# Patient Record
Sex: MALE | URBAN - METROPOLITAN AREA
[De-identification: names, ages, dates, MRNs, and addresses within clinical notes are randomized per-mention and may not be internally consistent; named-entity substitution may affect disease eponyms.]

---

## 2022-07-26 ENCOUNTER — INPATIENT (INPATIENT)
Facility: HOSPITAL | Age: 42
LOS: 0 days | Discharge: LEFT BEFORE DISCHARGE | End: 2022-07-27
Attending: STUDENT IN AN ORGANIZED HEALTH CARE EDUCATION/TRAINING PROGRAM | Admitting: STUDENT IN AN ORGANIZED HEALTH CARE EDUCATION/TRAINING PROGRAM

## 2022-07-26 VITALS
SYSTOLIC BLOOD PRESSURE: 138 MMHG | HEIGHT: 70 IN | TEMPERATURE: 99 F | WEIGHT: 166.89 LBS | OXYGEN SATURATION: 99 % | RESPIRATION RATE: 18 BRPM | DIASTOLIC BLOOD PRESSURE: 103 MMHG | HEART RATE: 108 BPM

## 2022-07-26 DIAGNOSIS — M62.82 RHABDOMYOLYSIS: ICD-10-CM

## 2022-07-26 DIAGNOSIS — Z98.890 OTHER SPECIFIED POSTPROCEDURAL STATES: Chronic | ICD-10-CM

## 2022-07-26 DIAGNOSIS — N17.9 ACUTE KIDNEY FAILURE, UNSPECIFIED: ICD-10-CM

## 2022-07-26 LAB
ALBUMIN SERPL ELPH-MCNC: 5.6 G/DL — HIGH (ref 3.3–5)
ALP SERPL-CCNC: 114 U/L — SIGNIFICANT CHANGE UP (ref 40–120)
ALT FLD-CCNC: 54 U/L — SIGNIFICANT CHANGE UP (ref 12–78)
AMPHET UR-MCNC: NEGATIVE — SIGNIFICANT CHANGE UP
ANION GAP SERPL CALC-SCNC: 17 MMOL/L — SIGNIFICANT CHANGE UP (ref 5–17)
APPEARANCE UR: ABNORMAL
AST SERPL-CCNC: 46 U/L — HIGH (ref 15–37)
BACTERIA # UR AUTO: ABNORMAL
BARBITURATES UR SCN-MCNC: NEGATIVE — SIGNIFICANT CHANGE UP
BASOPHILS # BLD AUTO: 0.04 K/UL — SIGNIFICANT CHANGE UP (ref 0–0.2)
BASOPHILS NFR BLD AUTO: 0.2 % — SIGNIFICANT CHANGE UP (ref 0–2)
BENZODIAZ UR-MCNC: NEGATIVE — SIGNIFICANT CHANGE UP
BILIRUB SERPL-MCNC: 0.5 MG/DL — SIGNIFICANT CHANGE UP (ref 0.2–1.2)
BILIRUB UR-MCNC: ABNORMAL
BUN SERPL-MCNC: 63 MG/DL — HIGH (ref 7–23)
CALCIUM SERPL-MCNC: 11.2 MG/DL — HIGH (ref 8.5–10.1)
CHLORIDE SERPL-SCNC: 86 MMOL/L — LOW (ref 96–108)
CK SERPL-CCNC: 2391 U/L — HIGH (ref 26–308)
CO2 SERPL-SCNC: 25 MMOL/L — SIGNIFICANT CHANGE UP (ref 22–31)
COCAINE METAB.OTHER UR-MCNC: NEGATIVE — SIGNIFICANT CHANGE UP
COLOR SPEC: YELLOW — SIGNIFICANT CHANGE UP
CREAT SERPL-MCNC: 5.54 MG/DL — HIGH (ref 0.5–1.3)
DIFF PNL FLD: ABNORMAL
EGFR: 13 ML/MIN/1.73M2 — LOW
EOSINOPHIL # BLD AUTO: 0.01 K/UL — SIGNIFICANT CHANGE UP (ref 0–0.5)
EOSINOPHIL NFR BLD AUTO: 0.1 % — SIGNIFICANT CHANGE UP (ref 0–6)
EPI CELLS # UR: SIGNIFICANT CHANGE UP
FLUAV AG NPH QL: SIGNIFICANT CHANGE UP
FLUBV AG NPH QL: SIGNIFICANT CHANGE UP
GLUCOSE BLDC GLUCOMTR-MCNC: 103 MG/DL — HIGH (ref 70–99)
GLUCOSE BLDC GLUCOMTR-MCNC: 98 MG/DL — SIGNIFICANT CHANGE UP (ref 70–99)
GLUCOSE SERPL-MCNC: 142 MG/DL — HIGH (ref 70–99)
GLUCOSE UR QL: NEGATIVE MG/DL — SIGNIFICANT CHANGE UP
GRAN CASTS # UR COMP ASSIST: ABNORMAL /LPF
HCT VFR BLD CALC: 56.3 % — HIGH (ref 39–50)
HGB BLD-MCNC: 19 G/DL — CRITICAL HIGH (ref 13–17)
HYALINE CASTS # UR AUTO: ABNORMAL /LPF
IMM GRANULOCYTES NFR BLD AUTO: 0.8 % — SIGNIFICANT CHANGE UP (ref 0–1.5)
KETONES UR-MCNC: ABNORMAL
LEUKOCYTE ESTERASE UR-ACNC: ABNORMAL
LYMPHOCYTES # BLD AUTO: 14.7 % — SIGNIFICANT CHANGE UP (ref 13–44)
LYMPHOCYTES # BLD AUTO: 2.84 K/UL — SIGNIFICANT CHANGE UP (ref 1–3.3)
MAGNESIUM SERPL-MCNC: 4 MG/DL — HIGH (ref 1.6–2.6)
MCHC RBC-ENTMCNC: 29.5 PG — SIGNIFICANT CHANGE UP (ref 27–34)
MCHC RBC-ENTMCNC: 33.7 G/DL — SIGNIFICANT CHANGE UP (ref 32–36)
MCV RBC AUTO: 87.3 FL — SIGNIFICANT CHANGE UP (ref 80–100)
METHADONE UR-MCNC: NEGATIVE — SIGNIFICANT CHANGE UP
MONOCYTES # BLD AUTO: 1.01 K/UL — HIGH (ref 0–0.9)
MONOCYTES NFR BLD AUTO: 5.2 % — SIGNIFICANT CHANGE UP (ref 2–14)
NEUTROPHILS # BLD AUTO: 15.26 K/UL — HIGH (ref 1.8–7.4)
NEUTROPHILS NFR BLD AUTO: 79 % — HIGH (ref 43–77)
NITRITE UR-MCNC: NEGATIVE — SIGNIFICANT CHANGE UP
NRBC # BLD: 0 /100 WBCS — SIGNIFICANT CHANGE UP (ref 0–0)
OPIATES UR-MCNC: NEGATIVE — SIGNIFICANT CHANGE UP
PCP SPEC-MCNC: SIGNIFICANT CHANGE UP
PCP UR-MCNC: NEGATIVE — SIGNIFICANT CHANGE UP
PH UR: 5 — SIGNIFICANT CHANGE UP (ref 5–8)
PHOSPHATE SERPL-MCNC: 5.3 MG/DL — HIGH (ref 2.5–4.5)
PLATELET # BLD AUTO: 275 K/UL — SIGNIFICANT CHANGE UP (ref 150–400)
POTASSIUM SERPL-MCNC: 5.6 MMOL/L — HIGH (ref 3.5–5.3)
POTASSIUM SERPL-SCNC: 5.6 MMOL/L — HIGH (ref 3.5–5.3)
PROT SERPL-MCNC: 10.9 GM/DL — HIGH (ref 6–8.3)
PROT UR-MCNC: 100 MG/DL
RBC # BLD: 6.45 M/UL — HIGH (ref 4.2–5.8)
RBC # FLD: 14.5 % — SIGNIFICANT CHANGE UP (ref 10.3–14.5)
RBC CASTS # UR COMP ASSIST: ABNORMAL /HPF (ref 0–4)
SARS-COV-2 RNA SPEC QL NAA+PROBE: SIGNIFICANT CHANGE UP
SODIUM SERPL-SCNC: 128 MMOL/L — LOW (ref 135–145)
SP GR SPEC: 1.02 — SIGNIFICANT CHANGE UP (ref 1.01–1.02)
THC UR QL: POSITIVE — SIGNIFICANT CHANGE UP
UROBILINOGEN FLD QL: NEGATIVE MG/DL — SIGNIFICANT CHANGE UP
WBC # BLD: 19.31 K/UL — HIGH (ref 3.8–10.5)
WBC # FLD AUTO: 19.31 K/UL — HIGH (ref 3.8–10.5)
WBC UR QL: ABNORMAL

## 2022-07-26 PROCEDURE — 99285 EMERGENCY DEPT VISIT HI MDM: CPT

## 2022-07-26 PROCEDURE — 99223 1ST HOSP IP/OBS HIGH 75: CPT

## 2022-07-26 RX ORDER — SODIUM CHLORIDE 9 MG/ML
1000 INJECTION INTRAMUSCULAR; INTRAVENOUS; SUBCUTANEOUS
Refills: 0 | Status: DISCONTINUED | OUTPATIENT
Start: 2022-07-26 | End: 2022-07-27

## 2022-07-26 RX ORDER — DEXTROSE 50 % IN WATER 50 %
50 SYRINGE (ML) INTRAVENOUS ONCE
Refills: 0 | Status: COMPLETED | OUTPATIENT
Start: 2022-07-26 | End: 2022-07-26

## 2022-07-26 RX ORDER — OXYCODONE HYDROCHLORIDE 5 MG/1
5 TABLET ORAL EVERY 6 HOURS
Refills: 0 | Status: DISCONTINUED | OUTPATIENT
Start: 2022-07-26 | End: 2022-07-27

## 2022-07-26 RX ORDER — KETOROLAC TROMETHAMINE 30 MG/ML
30 SYRINGE (ML) INJECTION ONCE
Refills: 0 | Status: DISCONTINUED | OUTPATIENT
Start: 2022-07-26 | End: 2022-07-26

## 2022-07-26 RX ORDER — HEPARIN SODIUM 5000 [USP'U]/ML
5000 INJECTION INTRAVENOUS; SUBCUTANEOUS EVERY 12 HOURS
Refills: 0 | Status: DISCONTINUED | OUTPATIENT
Start: 2022-07-26 | End: 2022-07-27

## 2022-07-26 RX ORDER — INSULIN HUMAN 100 [IU]/ML
5 INJECTION, SOLUTION SUBCUTANEOUS ONCE
Refills: 0 | Status: COMPLETED | OUTPATIENT
Start: 2022-07-26 | End: 2022-07-26

## 2022-07-26 RX ORDER — SODIUM CHLORIDE 9 MG/ML
1000 INJECTION INTRAMUSCULAR; INTRAVENOUS; SUBCUTANEOUS ONCE
Refills: 0 | Status: COMPLETED | OUTPATIENT
Start: 2022-07-26 | End: 2022-07-26

## 2022-07-26 RX ORDER — SODIUM CHLORIDE 9 MG/ML
2000 INJECTION INTRAMUSCULAR; INTRAVENOUS; SUBCUTANEOUS ONCE
Refills: 0 | Status: COMPLETED | OUTPATIENT
Start: 2022-07-26 | End: 2022-07-26

## 2022-07-26 RX ADMIN — HEPARIN SODIUM 5000 UNIT(S): 5000 INJECTION INTRAVENOUS; SUBCUTANEOUS at 18:44

## 2022-07-26 RX ADMIN — SODIUM CHLORIDE 1000 MILLILITER(S): 9 INJECTION INTRAMUSCULAR; INTRAVENOUS; SUBCUTANEOUS at 11:49

## 2022-07-26 RX ADMIN — Medication 50 MILLILITER(S): at 12:47

## 2022-07-26 RX ADMIN — SODIUM CHLORIDE 150 MILLILITER(S): 9 INJECTION INTRAMUSCULAR; INTRAVENOUS; SUBCUTANEOUS at 21:12

## 2022-07-26 RX ADMIN — INSULIN HUMAN 5 UNIT(S): 100 INJECTION, SOLUTION SUBCUTANEOUS at 12:46

## 2022-07-26 RX ADMIN — SODIUM CHLORIDE 2000 MILLILITER(S): 9 INJECTION INTRAMUSCULAR; INTRAVENOUS; SUBCUTANEOUS at 08:32

## 2022-07-26 RX ADMIN — Medication 30 MILLIGRAM(S): at 09:44

## 2022-07-26 RX ADMIN — SODIUM CHLORIDE 150 MILLILITER(S): 9 INJECTION INTRAMUSCULAR; INTRAVENOUS; SUBCUTANEOUS at 13:17

## 2022-07-26 RX ADMIN — Medication 30 MILLIGRAM(S): at 08:33

## 2022-07-26 NOTE — ED ADULT TRIAGE NOTE - CHIEF COMPLAINT QUOTE
pt c/o body cramping, muscle spasm started yesterday. ems states pt was in hot room. hx: cervical disc sx

## 2022-07-26 NOTE — H&P ADULT - ASSESSMENT
41M with PMHx of well controlled asthma presenting for one day history of weakness and whole body myalgias. Patient with likely acute renal failure secondary to rhabdomyolysis which may be secondary to heat exposure.

## 2022-07-26 NOTE — ED ADULT NURSE NOTE - OBJECTIVE STATEMENT
Pt received AAOx3 c/o body cramping, muscle spasm started yesterday. ems states pt was in hot room. hx: cervical disc sx. Pt received diaphoretic having body aches & muscle spasm. No Cx pain, no labored breathing.

## 2022-07-26 NOTE — H&P ADULT - NSHPPHYSICALEXAM_GEN_ALL_CORE
T(C): 37.1 (07-26-22 @ 07:34), Max: 37.1 (07-26-22 @ 07:34)  HR: 89 (07-26-22 @ 11:00) (89 - 108)  BP: 133/87 (07-26-22 @ 11:00) (133/87 - 138/103)  RR: 17 (07-26-22 @ 11:00) (17 - 18)  SpO2: 99% (07-26-22 @ 11:00) (99% - 99%)    GEN: male in NAD, appears comfortable, no diaphoresis  EYES: No scleral injection, PERRL, EOMI  ENTM: neck supple & symmetric without tracheal deviation, moist membranes, no gross hearing impairment, thyroid gland not enlarged  CV: +S1/S2, no m/r/g, no abdominal bruit, no LE edema  RESP: breathing comfortably, no respiratory accessory muscle use, CTAB, no w/r/r  GI: normoactive BS, soft, NTND, no rebounding/guarding, no palpable masses  LYMPHATICS: no LAD or tenderness to palpation  NEURO: AOx3, no focal deficits, CNII-XII grossly intact  PSYCH: No SI/HI/AVH, appropriate affect, appropriate insight/judgment   SKIN: no petechiae, ecchymosis or maculopapular rash noted

## 2022-07-26 NOTE — ED ADULT NURSE REASSESSMENT NOTE - NS ED NURSE REASSESS COMMENT FT1
1218- Pt was reassessed, pt expressed feeling better. No s/s acute distress. Pt admitted, report given to ED hold Nurse Anjel Weathers

## 2022-07-26 NOTE — ED PROVIDER NOTE - CARE PLAN
1 Principal Discharge DX:	Rhabdomyolysis  Secondary Diagnosis:	Dehydration  Secondary Diagnosis:	Acute renal failure

## 2022-07-26 NOTE — PATIENT PROFILE ADULT - FOOD INSECURITY
EXCUSE FOR SCHOOL      2022        Re: Jeff Mayo  [address] [facility] [address]      Please excuse Jeff Mayo,  2011 from school on 22 due to illness.      RESTRICTIONS: none          Electronically signed by SIM Mosquera: VISHAL ,   2022  SIM Mosquera: SIM Garay: VISHAL   [facility]  [address] [address] [phone]   no

## 2022-07-26 NOTE — ED PROVIDER NOTE - OBJECTIVE STATEMENT
41 year old male w/PMH of recent cervical disc surgery, otherwise presents to the ED for generalized muscle cramps throughout the body. Pt states he's been having a lot of sweat and heat in his shelter and had requested air condition but was turned down. Denies fever or chills. Pt states the muscle cramps most likely come from dehydration despite having significant water intake.

## 2022-07-26 NOTE — H&P ADULT - NSHPREVIEWOFSYSTEMS_GEN_ALL_CORE
GEN: no night sweats or change in appetite  EYES: no changes in vision or diplopia   ENT: no epistaxis, sinus pain, gingival bleeding, odynophagia or dysphagia  CV: no CP, PND or palpitations  RESP: no cough, wheezing, or hemoptysis  GI: no hematemesis, hematochezia, or melena  : no dysuria, polyuria, or hematuria  MSK: no arthralgias or joint swelling; +myalgia   NEURO: no gross sensory changes, numbness, focal deficits  PSYCH: no depression or changes in concentration  HEME/ONC: no purpura, petechiae or night sweats  SKIN: no pruritus, hair loss or skin lesions  ALL: no photosensitivity, no complaints of anaphylaxis (SOB, throat swelling)

## 2022-07-26 NOTE — H&P ADULT - PROBLEM SELECTOR PLAN 1
May be anuric or oliguric, will fluid challenge +/- diuretics as below  Strict I&Os, bladder scan, may require Awad  Renal consulted  Send UA and UTox  Treat hyperkalemia medically

## 2022-07-26 NOTE — CONSULT NOTE ADULT - SUBJECTIVE AND OBJECTIVE BOX
NEPHROLOGY CONSULTATION    CHIEF COMPLAINT:  ANGEL    HPI:  Comes in with severe muscle cramping and found to be in ANGEL.  He has been hydrated with good improvement in pain and now making urine.  He has been living in shelter without A/C and thinks he got dehydrated.  He has no prior hx of CKD.  Recent surgery on neck but no NSAIDS.    ROS:  denies CP, SOB    PAST MEDICAL & SURGICAL HISTORY:  Asthma      H/O cervical discectomy          SOCIAL HISTORY:  no illicit drugs    FAMILY HISTORY:  grandmother had DM and ESRD      MEDICATIONS  (STANDING):  heparin   Injectable 5000 Unit(s) SubCutaneous every 12 hours  sodium chloride 0.9%. 1000 milliLiter(s) (150 mL/Hr) IV Continuous <Continuous>      PHYSICAL EXAMINATION:  T(F): 98.8 (22 @ 07:34)  HR: 89 (22 @ 11:00)  BP: 133/87 (22 @ 11:00)  RR: 17 (22 @ 11:00)  SpO2: 99% (22 @ 11:00)  Conversant, no apparent distress  PERRLA, pink conjunctivae, no ptosis  Good dentition, no pharyngeal erythema  Neck non tender, no mass, no thyromegaly or nodules  Normal respiratory effort, lungs clear to auscultation  Heart with RRR, no murmurs or rubs, no peripheral edema  Abdomen soft, no masses, no organomegaly  Skin no rashes, ulcers or lesions, normal turgor and temperature  Appropriate affect, AO x 3    LABS:                        19.0   19.31 )-----------( 275      ( 2022 08:55 )             56.3         128<L>  |  86<L>  |  63<H>  ----------------------------<  142<H>  5.6<H>   |  25  |  5.54<H>    Ca    11.2<H>      2022 08:55  Phos  5.3       Mg     4.0         TPro  10.9<H>  /  Alb  5.6<H>  /  TBili  0.5  /  DBili  x   /  AST  46<H>  /  ALT  54  /  AlkPhos  114      Urinalysis Basic - ( 2022 12:40 )    Color: Yellow / Appearance: Slightly Turbid / S.025 / pH: x  Gluc: x / Ketone: Trace  / Bili: Small / Urobili: Negative mg/dL   Blood: x / Protein: 100 mg/dL / Nitrite: Negative   Leuk Esterase: Trace / RBC: x / WBC x   Sq Epi: x / Non Sq Epi: x / Bacteria: x    CPK 2391        ASSESSMENT:  1.  ANGEL - out of proportion to mild rhabdomyolysis - suspect largely prerenal azotemia  2.  Mild hyperkalemia due to above  3.  Mild rhabdomyolysis    PLAN:  Continue to hydrate with NS and repeat electrolytes tonight and in AM

## 2022-07-26 NOTE — ED PROVIDER NOTE - CLINICAL SUMMARY MEDICAL DECISION MAKING FREE TEXT BOX
Pt with signs of dehydration noted on lab work and acute renal failure. Will eval for rhabdomyolysis and will admit for further care with medicine service.

## 2022-07-26 NOTE — H&P ADULT - HISTORY OF PRESENT ILLNESS
41M with PMHx of well controlled asthma presenting for one day history of weakness and whole body myalgias. Patient had cervical fusion two weeks prior and has been taking Percocet and cyclobenzaprine PRN (but has not been taking more than prescribed). He states he lives in a homeless shelter with his girlfriend and it is very hot there. He states he was diaphoretic and drinking a lot of water, but did not have access to air conditioning. Patient with no known kidney issues and presumptively had normal renal function on pre-op labs. Denies taking any medications over the counter or illicit substances. Denies fever or chills. Cannot remember the last time he urinated was despite taking time to think about it. Denies nausea, vomiting or loss of appetite. On arrival noted to be diaphoretic and in renal failure. Patient given 3 L NS.

## 2022-07-26 NOTE — PATIENT PROFILE ADULT - FALL HARM RISK - RISK INTERVENTIONS

## 2022-07-26 NOTE — ED PROVIDER NOTE - CONSTITUTIONAL, MLM
normal... Well appearing, awake, alert, oriented to person, place, time/situation and in no mild distress secondary to pain.

## 2022-07-26 NOTE — PATIENT PROFILE ADULT - FUNCTIONAL ASSESSMENT - DAILY ACTIVITY 2.
Quality 110: Preventive Care And Screening: Influenza Immunization: Influenza Immunization Administered during Influenza season Detail Level: Detailed Quality 111:Pneumonia Vaccination Status For Older Adults: Pneumococcal vaccine administered on or after patient’s 60th birthday and before the end of the measurement period 4 = No assist / stand by assistance

## 2022-07-26 NOTE — H&P ADULT - PROBLEM SELECTOR PLAN 2
Start aggressive IV hydration with NS at 150 cc/hr and monitor volume status  If oliguric/anuric may require HD  Trend CK

## 2022-07-27 VITALS
DIASTOLIC BLOOD PRESSURE: 83 MMHG | OXYGEN SATURATION: 99 % | RESPIRATION RATE: 18 BRPM | HEART RATE: 73 BPM | TEMPERATURE: 98 F | SYSTOLIC BLOOD PRESSURE: 139 MMHG

## 2022-07-27 LAB
ALBUMIN SERPL ELPH-MCNC: 3.3 G/DL — SIGNIFICANT CHANGE UP (ref 3.3–5)
ALP SERPL-CCNC: 59 U/L — SIGNIFICANT CHANGE UP (ref 40–120)
ALT FLD-CCNC: 34 U/L — SIGNIFICANT CHANGE UP (ref 12–78)
ANION GAP SERPL CALC-SCNC: 4 MMOL/L — LOW (ref 5–17)
APTT BLD: 30.5 SEC — SIGNIFICANT CHANGE UP (ref 27.5–35.5)
AST SERPL-CCNC: 62 U/L — HIGH (ref 15–37)
BILIRUB SERPL-MCNC: 0.5 MG/DL — SIGNIFICANT CHANGE UP (ref 0.2–1.2)
BLD GP AB SCN SERPL QL: SIGNIFICANT CHANGE UP
BUN SERPL-MCNC: 47 MG/DL — HIGH (ref 7–23)
CALCIUM SERPL-MCNC: 8.8 MG/DL — SIGNIFICANT CHANGE UP (ref 8.5–10.1)
CHLORIDE SERPL-SCNC: 106 MMOL/L — SIGNIFICANT CHANGE UP (ref 96–108)
CK SERPL-CCNC: 2757 U/L — HIGH (ref 26–308)
CO2 SERPL-SCNC: 27 MMOL/L — SIGNIFICANT CHANGE UP (ref 22–31)
CREAT SERPL-MCNC: 1.7 MG/DL — HIGH (ref 0.5–1.3)
EGFR: 52 ML/MIN/1.73M2 — LOW
GLUCOSE SERPL-MCNC: 88 MG/DL — SIGNIFICANT CHANGE UP (ref 70–99)
HCT VFR BLD CALC: 42.6 % — SIGNIFICANT CHANGE UP (ref 39–50)
HGB BLD-MCNC: 14 G/DL — SIGNIFICANT CHANGE UP (ref 13–17)
INR BLD: 0.97 RATIO — SIGNIFICANT CHANGE UP (ref 0.88–1.16)
MCHC RBC-ENTMCNC: 29.9 PG — SIGNIFICANT CHANGE UP (ref 27–34)
MCHC RBC-ENTMCNC: 32.9 G/DL — SIGNIFICANT CHANGE UP (ref 32–36)
MCV RBC AUTO: 91 FL — SIGNIFICANT CHANGE UP (ref 80–100)
NRBC # BLD: 0 /100 WBCS — SIGNIFICANT CHANGE UP (ref 0–0)
PHOSPHATE SERPL-MCNC: 3.1 MG/DL — SIGNIFICANT CHANGE UP (ref 2.5–4.5)
PLATELET # BLD AUTO: 180 K/UL — SIGNIFICANT CHANGE UP (ref 150–400)
POTASSIUM SERPL-MCNC: 5.4 MMOL/L — HIGH (ref 3.5–5.3)
POTASSIUM SERPL-SCNC: 5.4 MMOL/L — HIGH (ref 3.5–5.3)
PROT SERPL-MCNC: 6.4 GM/DL — SIGNIFICANT CHANGE UP (ref 6–8.3)
PROTHROM AB SERPL-ACNC: 11.6 SEC — SIGNIFICANT CHANGE UP (ref 10.5–13.4)
RBC # BLD: 4.68 M/UL — SIGNIFICANT CHANGE UP (ref 4.2–5.8)
RBC # FLD: 14.6 % — HIGH (ref 10.3–14.5)
SODIUM SERPL-SCNC: 137 MMOL/L — SIGNIFICANT CHANGE UP (ref 135–145)
WBC # BLD: 7.26 K/UL — SIGNIFICANT CHANGE UP (ref 3.8–10.5)
WBC # FLD AUTO: 7.26 K/UL — SIGNIFICANT CHANGE UP (ref 3.8–10.5)

## 2022-07-27 PROCEDURE — 99233 SBSQ HOSP IP/OBS HIGH 50: CPT

## 2022-07-27 RX ORDER — SODIUM CHLORIDE 9 MG/ML
1000 INJECTION INTRAMUSCULAR; INTRAVENOUS; SUBCUTANEOUS
Refills: 0 | Status: DISCONTINUED | OUTPATIENT
Start: 2022-07-27 | End: 2022-07-27

## 2022-07-27 RX ADMIN — HEPARIN SODIUM 5000 UNIT(S): 5000 INJECTION INTRAVENOUS; SUBCUTANEOUS at 05:27

## 2022-07-27 RX ADMIN — SODIUM CHLORIDE 125 MILLILITER(S): 9 INJECTION INTRAMUSCULAR; INTRAVENOUS; SUBCUTANEOUS at 10:44

## 2022-07-27 RX ADMIN — SODIUM CHLORIDE 150 MILLILITER(S): 9 INJECTION INTRAMUSCULAR; INTRAVENOUS; SUBCUTANEOUS at 04:07

## 2022-07-27 NOTE — PROGRESS NOTE ADULT - NSPROGADDITIONALINFOA_GEN_ALL_CORE
Patient requesting to sign out AMA due to wife not being able to come to the hospital because of unvaccinated status. Risks discussed with patient. Patient agreed to return to hospital if symptoms worsen.

## 2022-07-27 NOTE — PROGRESS NOTE ADULT - SUBJECTIVE AND OBJECTIVE BOX
Patient is a 41y old  Male who presents with a chief complaint of Muscle Aches (2022 09:27)    INTERVAL HPI/OVERNIGHT EVENTS: Patients seen and examined at bedside this morning. No acute events overnight. Pt reports muscle aches are improving. Urinating.     MEDICATIONS  (STANDING):  heparin   Injectable 5000 Unit(s) SubCutaneous every 12 hours  sodium chloride 0.9%. 1000 milliLiter(s) (125 mL/Hr) IV Continuous <Continuous>    MEDICATIONS  (PRN):  oxyCODONE    IR 5 milliGRAM(s) Oral every 6 hours PRN Severe Pain (7 - 10)    Allergies    No Known Allergies    Intolerances      REVIEW OF SYSTEMS:  All other systems reviewed and are negative    Vital Signs Last 24 Hrs  T(C): 36.5 (2022 11:00), Max: 37.1 (2022 15:52)  T(F): 97.7 (2022 11:00), Max: 98.8 (2022 15:52)  HR: 73 (2022 11:00) (60 - 81)  BP: 139/83 (2022 11:00) (115/78 - 139/83)  BP(mean): --  RR: 18 (2022 11:00) (16 - 18)  SpO2: 99% (2022 11:00) (96% - 100%)    Parameters below as of 2022 05:55  Patient On (Oxygen Delivery Method): room air      Daily     Daily   I&O's Summary    2022 07:01  -  2022 07:00  --------------------------------------------------------  IN: 2100 mL / OUT: 1310 mL / NET: 790 mL    2022 07:01  -  2022 13:02  --------------------------------------------------------  IN: 0 mL / OUT: 570 mL / NET: -570 mL      CAPILLARY BLOOD GLUCOSE      POCT Blood Glucose.: 98 mg/dL (2022 14:06)    PHYSICAL EXAM:  GENERAL: NAD, well-groomed, well-developed  HEAD:  Atraumatic, Normocephalic  EYES: EOMI, PERRLA, conjunctiva and sclera clear  ENMT: No tonsillar erythema, exudates, or enlargement; Moist mucous membranes, Good dentition, No lesions  NECK: Supple, No JVD, Normal thyroid  NERVOUS SYSTEM:  Alert & Oriented X3, Good concentration; Motor Strength 5/5 B/L upper and lower extremities; DTRs 2+ intact and symmetric  CHEST/LUNG: Clear to percussion bilaterally; No rales, rhonchi, wheezing, or rubs  HEART: Regular rate and rhythm; No murmurs, rubs, or gallops  ABDOMEN: Soft, Nontender, Nondistended; Bowel sounds present  EXTREMITIES:  2+ Peripheral Pulses, No clubbing, cyanosis, or edema  LYMPH: No lymphadenopathy noted  SKIN: No rashes or lesions    Labs                          14.0   7.26  )-----------( 180      ( 2022 05:30 )             42.6     07-    137  |  106  |  47<H>  ----------------------------<  88  5.4<H>   |  27  |  1.70<H>    Ca    8.8      2022 05:30  Phos  3.1     07-27  Mg     4.0     07-    TPro  6.4  /  Alb  3.3  /  TBili  0.5  /  DBili  x   /  AST  62<H>  /  ALT  34  /  AlkPhos  59  07-27    PT/INR - ( 2022 05:30 )   PT: 11.6 sec;   INR: 0.97 ratio         PTT - ( 2022 05:30 )  PTT:30.5 sec  CARDIAC MARKERS ( 2022 05:30 )  x     / x     / 2757 U/L / x     / x      CARDIAC MARKERS ( 2022 08:55 )  x     / x     / 2391 U/L / x     / x          Urinalysis Basic - ( 2022 12:40 )    Color: Yellow / Appearance: Slightly Turbid / S.025 / pH: x  Gluc: x / Ketone: Trace  / Bili: Small / Urobili: Negative mg/dL   Blood: x / Protein: 100 mg/dL / Nitrite: Negative   Leuk Esterase: Trace / RBC: 3-5 /HPF / WBC 6-10   Sq Epi: x / Non Sq Epi: Occasional / Bacteria: Moderate                   Patient is a 41y old  Male who presents with a chief complaint of Muscle Aches (2022 09:27)    INTERVAL HPI/OVERNIGHT EVENTS: Patients seen and examined at bedside this morning. No acute events overnight. Pt reports muscle aches are improving. Urinating. Upset about wife not being able to come to hospital.     MEDICATIONS  (STANDING):  heparin   Injectable 5000 Unit(s) SubCutaneous every 12 hours  sodium chloride 0.9%. 1000 milliLiter(s) (125 mL/Hr) IV Continuous <Continuous>    MEDICATIONS  (PRN):  oxyCODONE    IR 5 milliGRAM(s) Oral every 6 hours PRN Severe Pain (7 - 10)    Allergies    No Known Allergies    Intolerances      REVIEW OF SYSTEMS:  All other systems reviewed and are negative    Vital Signs Last 24 Hrs  T(C): 36.5 (2022 11:00), Max: 37.1 (2022 15:52)  T(F): 97.7 (2022 11:00), Max: 98.8 (2022 15:52)  HR: 73 (2022 11:00) (60 - 81)  BP: 139/83 (2022 11:00) (115/78 - 139/83)  BP(mean): --  RR: 18 (2022 11:00) (16 - 18)  SpO2: 99% (2022 11:00) (96% - 100%)    Parameters below as of 2022 05:55  Patient On (Oxygen Delivery Method): room air      Daily     Daily   I&O's Summary    2022 07:01  -  2022 07:00  --------------------------------------------------------  IN: 2100 mL / OUT: 1310 mL / NET: 790 mL    2022 07:01  -  2022 13:02  --------------------------------------------------------  IN: 0 mL / OUT: 570 mL / NET: -570 mL      CAPILLARY BLOOD GLUCOSE      POCT Blood Glucose.: 98 mg/dL (2022 14:06)    PHYSICAL EXAM:  GENERAL: NAD, well-groomed, well-developed  HEAD:  Atraumatic, Normocephalic  EYES: EOMI, PERRLA, conjunctiva and sclera clear  ENMT: No tonsillar erythema, exudates, or enlargement; Moist mucous membranes, Good dentition, No lesions  NECK: Supple, No JVD, Normal thyroid  NERVOUS SYSTEM:  Alert & Oriented X3, Good concentration; Motor Strength 5/5 B/L upper and lower extremities; DTRs 2+ intact and symmetric  CHEST/LUNG: Clear to percussion bilaterally; No rales, rhonchi, wheezing, or rubs  HEART: Regular rate and rhythm; No murmurs, rubs, or gallops  ABDOMEN: Soft, Nontender, Nondistended; Bowel sounds present  EXTREMITIES:  2+ Peripheral Pulses, No clubbing, cyanosis, or edema  LYMPH: No lymphadenopathy noted  SKIN: No rashes or lesions    Labs                          14.0   7.26  )-----------( 180      ( 2022 05:30 )             42.6     07-27    137  |  106  |  47<H>  ----------------------------<  88  5.4<H>   |  27  |  1.70<H>    Ca    8.8      2022 05:30  Phos  3.1     07-  Mg     4.0     07-    TPro  6.4  /  Alb  3.3  /  TBili  0.5  /  DBili  x   /  AST  62<H>  /  ALT  34  /  AlkPhos  59  07-27    PT/INR - ( 2022 05:30 )   PT: 11.6 sec;   INR: 0.97 ratio         PTT - ( 2022 05:30 )  PTT:30.5 sec  CARDIAC MARKERS ( 2022 05:30 )  x     / x     / 2757 U/L / x     / x      CARDIAC MARKERS ( 2022 08:55 )  x     / x     / 2391 U/L / x     / x          Urinalysis Basic - ( 2022 12:40 )    Color: Yellow / Appearance: Slightly Turbid / S.025 / pH: x  Gluc: x / Ketone: Trace  / Bili: Small / Urobili: Negative mg/dL   Blood: x / Protein: 100 mg/dL / Nitrite: Negative   Leuk Esterase: Trace / RBC: 3-5 /HPF / WBC 6-10   Sq Epi: x / Non Sq Epi: Occasional / Bacteria: Moderate

## 2022-07-27 NOTE — PROGRESS NOTE ADULT - SUBJECTIVE AND OBJECTIVE BOX
NEPHROLOGY PROGRESS NOTE    CHIEF COMPLAINT:  ANGEL    HPI:  Feels well.  Good urine output.  Renal function improving quickly.     ROS:  no cramps    EXAM:  T(F): 97.8 (22 @ 05:55)  HR: 66 (22 @ 05:55)  BP: 119/77 (22 @ 05:55)  RR: 18 (22 @ 05:55)  SpO2: 100% (22 @ 05:55)    Conversant, in no apparent distress  Normal respiratory effort, lungs clear bilaterally  Heart RRR with no murmur, no peripheral edema         LABS                             14.0   7.26  )-----------( 180      ( 2022 05:30 )             42.6              137  |  106  |  47<H>  ----------------------------<  88  5.4<H>   |  27  |  1.70<H>    Ca    8.8      2022 05:30  Phos  3.1       Mg     4.0         TPro  6.4  /  Alb  3.3  /  TBili  0.5  /  DBili  x   /  AST  62<H>  /  ALT  34  /  AlkPhos  59      CPK 2757      Urinalysis Basic - ( 2022 12:40 )  Color: Yellow / Appearance: Slightly Turbid / S.025 / pH: x  Gluc: x / Ketone: Trace  / Bili: Small / Urobili: Negative mg/dL   Blood: x / Protein: 100 mg/dL / Nitrite: Negative   Leuk Esterase: Trace / RBC: 3-5 /HPF / WBC 6-10   Sq Epi: x / Non Sq Epi: Occasional / Bacteria: Moderate      ASSESSMENT:  1.  ANGEL - out of proportion to mild rhabdomyolysis - suspect largely prerenal azotemia - improving nicely  2.  Mild hyperkalemia due to above  3.  Mild rhabdomyolysis    PLAN:  Moderate IVF rate x 24 hours  BMP in AM   Repeat urinalysis now  Anticipate DC in 24 hours

## 2022-07-29 DIAGNOSIS — J45.909 UNSPECIFIED ASTHMA, UNCOMPLICATED: ICD-10-CM
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
## 2022-07-29 DIAGNOSIS — N17.9 ACUTE KIDNEY FAILURE, UNSPECIFIED: ICD-10-CM

## 2022-07-29 DIAGNOSIS — Z59.01 SHELTERED HOMELESSNESS: ICD-10-CM

## 2022-07-29 DIAGNOSIS — M62.82 RHABDOMYOLYSIS: ICD-10-CM

## 2022-07-29 DIAGNOSIS — E87.5 HYPERKALEMIA: ICD-10-CM

## 2022-07-29 DIAGNOSIS — E86.0 DEHYDRATION: ICD-10-CM

## 2022-07-29 SDOH — ECONOMIC STABILITY - HOUSING INSECURITY: SHELTERED HOMELESSNESS: Z59.01

## 2024-07-25 NOTE — PROGRESS NOTE ADULT - PROVIDER SPECIALTY LIST ADULT
Nephrology
PATIENT INSTRUCTIONS:    Take Meclizine in the morning for the next 3 days, and on Monday stop the Meclizine.  If true vertigo (room spinning) occurs, restart the Meclizine and call our office [phone].    Increase Lantus (long-acting) insulin dose to 27units every evening    Goals discussed:  A1c of 6.7-7.0%  Fasting BS of   Premeal  or less  2 hours postprandial  or less  Diabetic preventative care discussed:  Yearly diabetic dilated eye exam- monitor for retinopathy  Dental exams every 6 months  Yearly urine for microalbumin  Will need statin addition  Daily thorough foot exams-tops, bottoms, in between toes-use a mirror-monitor for any wounds, cuts, calluses and report immediately  Do not cut anything off feet  Toenail care- cut toenails straight across and file down sides to reduce the risk of ingrown toenails  All ingrown toenails to be removed by podiatry    
Hospitalist

## 2025-02-17 NOTE — H&P ADULT - PROBLEM SELECTOR PROBLEM 2
PT [facility] DOES NOT HAVE THIS PRESCRIPTION YET AND PT WOULD LIKE A CALL FROM CLINICAL TO DISCUSS LABS. PLEASE CALL PT WHEN AVAILABLE.   Rhabdomyolysis